# Patient Record
Sex: FEMALE | URBAN - METROPOLITAN AREA
[De-identification: names, ages, dates, MRNs, and addresses within clinical notes are randomized per-mention and may not be internally consistent; named-entity substitution may affect disease eponyms.]

---

## 2019-01-09 ENCOUNTER — TELEPHONE (OUTPATIENT)
Dept: GASTROENTEROLOGY | Facility: CLINIC | Age: 24
End: 2019-01-09

## 2019-01-09 NOTE — TELEPHONE ENCOUNTER
----- Message from Fariha Alvarado sent at 1/9/2019  2:22 PM CST -----  Contact: mom - sarah jesús - [phone]  Fito      Needs Advice    Reason for call:  Has u/c - is asking for appt        Communication Preference:  [phone]    Additional Information:

## 2019-01-10 ENCOUNTER — TELEPHONE (OUTPATIENT)
Dept: GASTROENTEROLOGY | Facility: CLINIC | Age: 24
End: 2019-01-10

## 2019-01-10 NOTE — TELEPHONE ENCOUNTER
----- Message from Joyce Aranda sent at 1/10/2019  2:05 PM CST -----  Contact: Pt mother:[phone] or  Pt:[phone]  .Patient Returning Call from [facility]    Who Left Message for Patient:Sheyla ZAYAS  Communication Preference:Pt mother:[phone] or  Pt:[phone]  Additional Information:

## 2021-01-20 ENCOUNTER — IMMUNIZATION (OUTPATIENT)
Dept: INTERNAL MEDICINE | Facility: CLINIC | Age: 26
End: 2021-01-20
Payer: COMMERCIAL

## 2021-01-20 DIAGNOSIS — Z23 NEED FOR VACCINATION: Primary | ICD-10-CM

## 2021-01-20 PROCEDURE — 91300 COVID-19, MRNA, LNP-S, PF, 30 MCG/0.3 ML DOSE VACCINE: CPT | Mod: PBBFAC

## 2021-02-10 ENCOUNTER — IMMUNIZATION (OUTPATIENT)
Dept: INTERNAL MEDICINE | Facility: CLINIC | Age: 26
End: 2021-02-10
Payer: COMMERCIAL

## 2021-02-10 DIAGNOSIS — Z23 NEED FOR VACCINATION: Primary | ICD-10-CM

## 2021-02-10 PROCEDURE — 0002A COVID-19, MRNA, LNP-S, PF, 30 MCG/0.3 ML DOSE VACCINE: CPT | Mod: PBBFAC | Performed by: FAMILY MEDICINE

## 2021-02-10 PROCEDURE — 91300 COVID-19, MRNA, LNP-S, PF, 30 MCG/0.3 ML DOSE VACCINE: CPT | Mod: PBBFAC | Performed by: FAMILY MEDICINE

## 2024-06-03 ENCOUNTER — HOSPITAL ENCOUNTER (OUTPATIENT)
Dept: RADIOLOGY | Facility: CLINIC | Age: 29
Discharge: HOME | End: 2024-06-03
Payer: COMMERCIAL

## 2024-06-03 ENCOUNTER — OFFICE VISIT (OUTPATIENT)
Dept: URGENT CARE | Facility: CLINIC | Age: 29
End: 2024-06-03
Payer: COMMERCIAL

## 2024-06-03 VITALS
BODY MASS INDEX: 21.41 KG/M2 | TEMPERATURE: 98 F | WEIGHT: 128.5 LBS | RESPIRATION RATE: 16 BRPM | OXYGEN SATURATION: 97 % | SYSTOLIC BLOOD PRESSURE: 105 MMHG | DIASTOLIC BLOOD PRESSURE: 61 MMHG | HEART RATE: 84 BPM | HEIGHT: 65 IN
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
## 2024-06-03 DIAGNOSIS — L03.011 CELLULITIS OF FINGER OF RIGHT HAND: ICD-10-CM

## 2024-06-03 DIAGNOSIS — S69.91XA FINGER INJURY, RIGHT, INITIAL ENCOUNTER: Primary | ICD-10-CM

## 2024-06-03 DIAGNOSIS — S69.91XA FINGER INJURY, RIGHT, INITIAL ENCOUNTER: ICD-10-CM

## 2024-06-03 DIAGNOSIS — S61.258A CAT BITE OF MIDDLE FINGER, INITIAL ENCOUNTER: ICD-10-CM

## 2024-06-03 PROCEDURE — 73140 X-RAY EXAM OF FINGER(S): CPT | Mod: RT,S$GLB,, | Performed by: RADIOLOGY

## 2024-06-03 PROCEDURE — 99203 OFFICE O/P NEW LOW 30 MIN: CPT | Mod: S$GLB,,,

## 2024-06-03 RX ORDER — MINERAL OIL
180 ENEMA (ML) RECTAL DAILY
COMMUNITY

## 2024-06-03 RX ORDER — ADALIMUMAB 40MG/0.8ML
40 KIT SUBCUTANEOUS
COMMUNITY

## 2024-06-03 RX ORDER — AMOXICILLIN 500 MG/1
500 TABLET, FILM COATED ORAL EVERY 12 HOURS
Qty: 10 TABLET | Refills: 0 | Status: SHIPPED | OUTPATIENT
Start: 2024-06-03 | End: 2024-06-08

## 2024-06-03 RX ORDER — MUPIROCIN 20 MG/G
OINTMENT TOPICAL 3 TIMES DAILY
Qty: 22 G | Refills: 0 | Status: SHIPPED | OUTPATIENT
Start: 2024-06-03

## 2024-06-03 NOTE — PROGRESS NOTES
"Subjective:      Patient ID: Daysi Morgan is a 29 y.o. female.    Vitals:  height is 5' 5.04" (1.652 m) and weight is 58.3 kg (128 lb 8.5 oz). Her temperature is 98.1 °F (36.7 °C). Her blood pressure is 105/61 and her pulse is 84. Her respiration is 16 and oxygen saturation is 97%.     Chief Complaint: No chief complaint on file.    Patient presents with right middle finger injury reports occurrence as 1 day ago  ROS   Objective:     Physical Exam    Assessment:     No diagnosis found.    Plan:       There are no diagnoses linked to this encounter.                "

## 2024-06-03 NOTE — PROGRESS NOTES
"Subjective:      Patient ID: Daysi Morgan is a 29 y.o. female.    Vitals:  height is 5' 5.04" (1.652 m) and weight is 58.3 kg (128 lb 8.5 oz). Her temperature is 98.1 °F (36.7 °C). Her blood pressure is 105/61 and her pulse is 84. Her respiration is 16 and oxygen saturation is 97%.     Chief Complaint: No chief complaint on file.    HPI  ROS   Objective:     Physical Exam    Assessment:     No diagnosis found.    Plan:       There are no diagnoses linked to this encounter.                "

## 2024-06-03 NOTE — PROGRESS NOTES
"Subjective:      Patient ID: Daysi Morgan is a 29 y.o. female.    Vitals:  height is 5' 5.04" (1.652 m) and weight is 58.3 kg (128 lb 8.5 oz). Her temperature is 98.1 °F (36.7 °C). Her blood pressure is 105/61 and her pulse is 84. Her respiration is 16 and oxygen saturation is 97%.     Chief Complaint: Injury    Daysi Morgan is a 29 y.o. female who presents for R middle finger injury which onset last night. Patient states she fell trying to grab her cat. She is unsure if her cat scratched/bit her in the process. Cat is UTD on vaccinations. Pain does not radiate. Associated sxs include swelling. No drainage. Patient denies any fever, chills, SOB, CP, n/v/d, weakness, or numbness/tingling. No treatments tried.     Injury  This is a new problem. The current episode started yesterday. The problem has been gradually worsening. Associated symptoms include joint swelling. Pertinent negatives include no abdominal pain, anorexia, change in bowel habit, chest pain, chills, congestion, coughing, diaphoresis, fatigue, fever, headaches, myalgias, nausea, neck pain, numbness, rash, sore throat, swollen glands, urinary symptoms, vertigo, visual change, vomiting or weakness. The symptoms are aggravated by exertion. She has tried nothing for the symptoms. The treatment provided no relief.       Constitution: Negative for chills, sweating, fatigue and fever.   HENT:  Negative for congestion and sore throat.    Neck: Negative for neck pain.   Cardiovascular:  Negative for chest pain and palpitations.   Respiratory:  Negative for cough and shortness of breath.    Gastrointestinal:  Negative for abdominal pain, nausea, vomiting and diarrhea.   Musculoskeletal:  Positive for pain (R 3rd finger) and joint swelling. Negative for muscle ache.   Skin:  Negative for rash and erythema.   Neurological:  Negative for dizziness, history of vertigo, headaches, numbness and tingling.      Objective:     Physical Exam   Constitutional: She is oriented " to person, place, and time. She appears well-developed.   HENT:   Head: Normocephalic and atraumatic. Head is without abrasion, without contusion and without laceration.   Ears:   Right Ear: External ear normal.   Left Ear: External ear normal.   Nose: Nose normal.   Mouth/Throat: Oropharynx is clear and moist and mucous membranes are normal.   Eyes: Conjunctivae, EOM and lids are normal. Pupils are equal, round, and reactive to light.   Neck: Trachea normal and phonation normal. Neck supple.   Cardiovascular: Normal rate, regular rhythm and normal heart sounds.   Pulmonary/Chest: Effort normal and breath sounds normal. No stridor. No respiratory distress.   Musculoskeletal: Normal range of motion.         General: Normal range of motion.        Hands:       Comments: R 3rd distal phalanx tenderness to palpation with erythema and swelling. There is DIP tenderness. Full ROM. 2 puncture wounds to the palmar aspect of the distal 3rd finger. No active discharge. No vesicles. Capillary refill < 2 seconds.   Neurological: She is alert and oriented to person, place, and time.   Skin: Skin is warm, dry, intact and no rash. Capillary refill takes less than 2 seconds. No abrasion, No burn, No bruising, No erythema and No ecchymosis   Psychiatric: Her speech is normal and behavior is normal. Judgment and thought content normal.   Nursing note and vitals reviewed.      Assessment:     1. Finger injury, right, initial encounter    2. Cat bite of middle finger, initial encounter    3. Cellulitis of finger of right hand        X-Ray Finger 2 or More Views Right  Narrative: EXAMINATION:  XR FINGER 2 OR MORE VIEWS RIGHT    CLINICAL HISTORY:  Unspecified injury of right wrist, hand and finger(s), initial encounter    TECHNIQUE:  Three view finger    COMPARISON:  None    FINDINGS:  No acute osseous or soft tissue abnormality.  No radiopaque foreign body.  Impression: As above    Electronically signed by: Peterson Pino  MD  Date:    06/03/2024  Time:    12:48      Plan:       Finger injury, right, initial encounter  -     X-Ray Finger 2 or More Views Right; Future; Expected date: 06/03/2024    Cat bite of middle finger, initial encounter    Cellulitis of finger of right hand  -     amoxicillin (AMOXIL) 500 MG Tab; Take 1 tablet (500 mg total) by mouth every 12 (twelve) hours. for 5 days  Dispense: 10 tablet; Refill: 0  -     mupirocin (BACTROBAN) 2 % ointment; Apply topically 3 (three) times daily.  Dispense: 22 g; Refill: 0      XR ordered. No acute fractures or dislocations.  Tdap UTD.   Will treat prophylactially due to possible cat bite and cellulitis.   Meds: amoxicillin sent to preferred pharmacy.  Discussed wound care instructions.  Tylenol/Ibuprofen as needed for pain.   RTC as needed.

## 2024-06-03 NOTE — PATIENT INSTRUCTIONS
If your condition worsens or fails to improve we recommend that you receive another evaluation at the ER immediately or contact your PCP to discuss your concerns.  You must understand that you've received Urgent Care treatment only and that you may be released before all your medical problems are known or treated. You the patient will arrange for follow-up care as instructed.   Follow up with your primary physician as needed.  Tylenol or ibuprofen for pain or fever as needed.  If prescribed narcotic pain medication and you do decide to take it, be sure to use precaution as it can make you sleepy or drowsy. Do not operate heavy machinery.  If you had X-Rays done, we will call you with the radiologist report if not discussed before discharge.  If you had labs done, we will call you with the results.  Either return here, see your PCP, or go to the ER immediately.  Clean the wound twice a day with soap and water and use prescribed antibiotic ointment. Do not use alcohol or hydrogen peroxide.